# Patient Record
Sex: MALE | Race: NATIVE HAWAIIAN OR OTHER PACIFIC ISLANDER | NOT HISPANIC OR LATINO | URBAN - METROPOLITAN AREA
[De-identification: names, ages, dates, MRNs, and addresses within clinical notes are randomized per-mention and may not be internally consistent; named-entity substitution may affect disease eponyms.]

---

## 2018-12-31 ENCOUNTER — INPATIENT (INPATIENT)
Facility: HOSPITAL | Age: 58
LOS: 2 days | Discharge: HOME | DRG: 287 | End: 2019-01-03
Attending: INTERNAL MEDICINE | Admitting: INTERNAL MEDICINE
Payer: SELF-PAY

## 2018-12-31 VITALS
TEMPERATURE: 98 F | OXYGEN SATURATION: 99 % | WEIGHT: 198.42 LBS | RESPIRATION RATE: 18 BRPM | SYSTOLIC BLOOD PRESSURE: 159 MMHG | HEART RATE: 62 BPM | DIASTOLIC BLOOD PRESSURE: 96 MMHG

## 2018-12-31 DIAGNOSIS — Z09 ENCOUNTER FOR FOLLOW-UP EXAMINATION AFTER COMPLETED TREATMENT FOR CONDITIONS OTHER THAN MALIGNANT NEOPLASM: Chronic | ICD-10-CM

## 2018-12-31 DIAGNOSIS — Z90.49 ACQUIRED ABSENCE OF OTHER SPECIFIED PARTS OF DIGESTIVE TRACT: Chronic | ICD-10-CM

## 2018-12-31 DIAGNOSIS — Z98.890 OTHER SPECIFIED POSTPROCEDURAL STATES: Chronic | ICD-10-CM

## 2018-12-31 DIAGNOSIS — I25.110 ATHEROSCLEROTIC HEART DISEASE OF NATIVE CORONARY ARTERY WITH UNSTABLE ANGINA PECTORIS: ICD-10-CM

## 2018-12-31 LAB
ALBUMIN SERPL ELPH-MCNC: 4.8 G/DL — SIGNIFICANT CHANGE UP (ref 3.3–5)
ALP SERPL-CCNC: 76 U/L — SIGNIFICANT CHANGE UP (ref 40–120)
ALT FLD-CCNC: 46 U/L — HIGH (ref 10–45)
ANION GAP SERPL CALC-SCNC: 11 MMOL/L — SIGNIFICANT CHANGE UP (ref 5–17)
APTT BLD: 34.4 SEC — SIGNIFICANT CHANGE UP (ref 27.5–36.3)
AST SERPL-CCNC: 29 U/L — SIGNIFICANT CHANGE UP (ref 10–40)
BASOPHILS NFR BLD AUTO: 0.5 % — SIGNIFICANT CHANGE UP (ref 0–2)
BILIRUB SERPL-MCNC: 0.7 MG/DL — SIGNIFICANT CHANGE UP (ref 0.2–1.2)
BUN SERPL-MCNC: 14 MG/DL — SIGNIFICANT CHANGE UP (ref 7–23)
CALCIUM SERPL-MCNC: 9.5 MG/DL — SIGNIFICANT CHANGE UP (ref 8.4–10.5)
CHLORIDE SERPL-SCNC: 104 MMOL/L — SIGNIFICANT CHANGE UP (ref 96–108)
CK MB CFR SERPL CALC: 7.4 NG/ML — HIGH (ref 0–6.7)
CK SERPL-CCNC: 363 U/L — HIGH (ref 30–200)
CO2 SERPL-SCNC: 24 MMOL/L — SIGNIFICANT CHANGE UP (ref 22–31)
CREAT SERPL-MCNC: 1.09 MG/DL — SIGNIFICANT CHANGE UP (ref 0.5–1.3)
EOSINOPHIL NFR BLD AUTO: 2.2 % — SIGNIFICANT CHANGE UP (ref 0–6)
EXTRA SST TUBE: SIGNIFICANT CHANGE UP
GLUCOSE SERPL-MCNC: 101 MG/DL — HIGH (ref 70–99)
HCT VFR BLD CALC: 40.5 % — SIGNIFICANT CHANGE UP (ref 39–50)
HGB BLD-MCNC: 14.2 G/DL — SIGNIFICANT CHANGE UP (ref 13–17)
INR BLD: 1.21 — HIGH (ref 0.88–1.16)
LYMPHOCYTES # BLD AUTO: 30.2 % — SIGNIFICANT CHANGE UP (ref 13–44)
MCHC RBC-ENTMCNC: 33.1 PG — SIGNIFICANT CHANGE UP (ref 27–34)
MCHC RBC-ENTMCNC: 35.1 G/DL — SIGNIFICANT CHANGE UP (ref 32–36)
MCV RBC AUTO: 94.4 FL — SIGNIFICANT CHANGE UP (ref 80–100)
MONOCYTES NFR BLD AUTO: 8.1 % — SIGNIFICANT CHANGE UP (ref 2–14)
NEUTROPHILS NFR BLD AUTO: 59 % — SIGNIFICANT CHANGE UP (ref 43–77)
PLATELET # BLD AUTO: 197 K/UL — SIGNIFICANT CHANGE UP (ref 150–400)
POTASSIUM SERPL-MCNC: 3.8 MMOL/L — SIGNIFICANT CHANGE UP (ref 3.5–5.3)
POTASSIUM SERPL-SCNC: 3.8 MMOL/L — SIGNIFICANT CHANGE UP (ref 3.5–5.3)
PROT SERPL-MCNC: 7.7 G/DL — SIGNIFICANT CHANGE UP (ref 6–8.3)
PROTHROM AB SERPL-ACNC: 13.7 SEC — HIGH (ref 10–12.9)
RBC # BLD: 4.29 M/UL — SIGNIFICANT CHANGE UP (ref 4.2–5.8)
RBC # FLD: 11.8 % — SIGNIFICANT CHANGE UP (ref 10.3–16.9)
SODIUM SERPL-SCNC: 139 MMOL/L — SIGNIFICANT CHANGE UP (ref 135–145)
TROPONIN T SERPL-MCNC: <0.01 NG/ML — SIGNIFICANT CHANGE UP (ref 0–0.01)
WBC # BLD: 8 K/UL — SIGNIFICANT CHANGE UP (ref 3.8–10.5)
WBC # FLD AUTO: 8 K/UL — SIGNIFICANT CHANGE UP (ref 3.8–10.5)

## 2018-12-31 PROCEDURE — 93010 ELECTROCARDIOGRAM REPORT: CPT

## 2018-12-31 PROCEDURE — 99285 EMERGENCY DEPT VISIT HI MDM: CPT | Mod: 25

## 2018-12-31 PROCEDURE — 71045 X-RAY EXAM CHEST 1 VIEW: CPT | Mod: 26

## 2018-12-31 NOTE — H&P ADULT - NSHPLABSRESULTS_GEN_ALL_CORE
14.2   8.0   )-----------( 197      ( 31 Dec 2018 17:22 )             40.5       12-31    139  |  104  |  14  ----------------------------<  101<H>  3.8   |  24  |  1.09    Ca    9.5      31 Dec 2018 17:22    TPro  7.7  /  Alb  4.8  /  TBili  0.7  /  DBili  x   /  AST  29  /  ALT  46<H>  /  AlkPhos  76  12-31      PT/INR - ( 31 Dec 2018 17:22 )   PT: 13.7 sec;   INR: 1.21          PTT - ( 31 Dec 2018 17:22 )  PTT:34.4 sec    CARDIAC MARKERS ( 31 Dec 2018 17:22 )  x     / <0.01 ng/mL / 363 U/L / x     / 7.4 ng/mL

## 2018-12-31 NOTE — ED ADULT NURSE NOTE - OBJECTIVE STATEMENT
57 y/o male c/o left sided chest pain x 2 hours associated w/ nausea while [place] to the train. Pt received 162 mg of Aspirin and 2 SL w/ relief of sx. Denies fever, chills, SOB, abdominal pain, back pain, and any other associated sx.

## 2018-12-31 NOTE — H&P ADULT - ASSESSMENT
59 y/o male with HTN, HPL, known CAD with prior MI treated with stents, admitted to [facility] with syncope and chest pain, 1st troponin negative, EKG with RBBB

## 2018-12-31 NOTE — ED PROVIDER NOTE - OBJECTIVE STATEMENT
chest pain started today.  feels better now.  got asa and ntg from ems  hx of stent x 3, last one 4 months ago chest pain started today.  also [place]  feels better now.  got asa and ntg from ems  hx of stent x 3, last one 4 months ago

## 2018-12-31 NOTE — H&P ADULT - NSHPREVIEWOFSYSTEMS_GEN_ALL_CORE
GENERAL, CONSTITUTIONAL : denies recent weight loss, fever, chills  EYES, VISION: denies changes in vision   EARS, NOSE, THROAT: denies hearing loss  HEART, CARDIOVASCULAR: +chest pain, denies arrhythmia, palpitations, SOB,  LE edema, claudication  RESPIRATORY: Denies cough, SOB, wheezing, PND, orthopnea  GASTROINTESTINAL: Denies abdominal pain, heartburn, bloody stool, dark tarry stool  GENITOURINARY: Denies frequent urination, urgency  MUSCULOSKELETAL denies joint pain or swelling, restricted motion, musculoskeletal pain.   SKIN & INTEGUMENTARY Denies rashes, sores, blisters, blisters, growths.  NEUROLOGICAL: Denies numbness or tingling sensations, sensation loss, burning.   PSYCHIATRIC: Denies nervousness, anxiety, depression  ENDOCRINE Denies heat or cold intolerance, excessive thirst  HEMATOLOGIC/LYMPHATIC: Denies abnormal bleeding, bleeding of any kind

## 2018-12-31 NOTE — H&P ADULT - HISTORY OF PRESENT ILLNESS
This is a 57 y/o male, poor historian, PMH HTN, HPL, recently diagnosed with DM-II (no meds), Hepatitis B, known CAD with MI treated  with 3 stents, most recent stent 4 months ago in [place] on Effient, BIBA s/p Syncope. 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 Pt could not elaborate much but states that he has been experiencing mid sternal chest pain rated 6/10, not as strong as his MI. He received Aspirin and Nitro by EMS  In the ER, pain was 3/10, 1st troponin negative, EKG with RBBB, no ST-T change. He is admitted for further evaluation

## 2018-12-31 NOTE — ED ADULT TRIAGE NOTE - CHIEF COMPLAINT QUOTE
patient BIBA complains of left sided chest pain for the last few hours. received 162 aspirin and 2 SL nitro with relief.

## 2018-12-31 NOTE — ED ADULT NURSE NOTE - NSIMPLEMENTINTERV_GEN_ALL_ED
Implemented All Universal Safety Interventions:  [employer] to call system. Call bell, personal items and telephone within reach. Instruct patient to call for assistance. Room bathroom lighting operational. Non-slip footwear when patient is off stretcher. Physically safe environment: no spills, clutter or unnecessary equipment. Stretcher in lowest position, wheels locked, appropriate side rails in place.

## 2018-12-31 NOTE — ED PROVIDER NOTE - MEDICAL DECISION MAKING DETAILS
59 yo male with hx of cad with episode of cp today.  given asa by ems. ekg no st elevation, pain is improved, now mild.  will get cardiac labs, cxr

## 2018-12-31 NOTE — H&P ADULT - PROBLEM SELECTOR PLAN 2
Pt to obtain name of hospital in [place] where he had stents to get report  Continue Effient and Aspirin, Lipitor

## 2018-12-31 NOTE — H&P ADULT - NSHPPHYSICALEXAM_GEN_ALL_CORE
Temp 98.6F, HR 70bpm, /80, RR 16, O2 sat 98% RA, Weight 200lbs, Height 5' 6"    T(C): 36.4 (12-31-18 @ 22:50), Max: 36.8 (12-31-18 @ 16:47)  HR: 53 (12-31-18 @ 23:44) (53 - 70)  BP: 112/62 (12-31-18 @ 23:44) (112/62 - 159/96)  RR: 16 (12-31-18 @ 23:44) (16 - 18)  SpO2: 96% (12-31-18 @ 23:44) (95% - 99%)  Wt(kg): --    Appearance: Normal	  HEENT:   Normal oral mucosa, PERRL, EOMI	  Neck: Supple,  - JVD; No Carotid Bruit and 2+ pulses B/L  Cardiovascular: Normal S1 S2, No JVD, No murmurs  Respiratory: Lungs clear to auscultation, No Rales, Rhonchi, Wheezing	  Gastrointestinal:  Soft, Non-tender, + BS	  Skin: No rashes, No ecchymoses, No cyanosis  Extremities: Normal range of motion, No clubbing, cyanosis or edema  Vascular: Femoral pulses 2+ b/l without bruit, DP 1+ b/l, PT 1+ b/l  Neurologic: Non-focal  Psychiatry: A & O x 3, Mood & affect appropriate

## 2018-12-31 NOTE — ED PROVIDER NOTE - CARE PLAN
Principal Discharge DX:	Chest pain with moderate risk for cardiac etiology  Secondary Diagnosis:	Syncope, unspecified syncope type  Secondary Diagnosis:	CAD (coronary artery disease)  Secondary Diagnosis:	Hypertension, unspecified type

## 2019-01-01 DIAGNOSIS — I25.10 ATHEROSCLEROTIC HEART DISEASE OF NATIVE CORONARY ARTERY WITHOUT ANGINA PECTORIS: ICD-10-CM

## 2019-01-01 DIAGNOSIS — R55 SYNCOPE AND COLLAPSE: ICD-10-CM

## 2019-01-01 DIAGNOSIS — E11.9 TYPE 2 DIABETES MELLITUS WITHOUT COMPLICATIONS: ICD-10-CM

## 2019-01-01 LAB
ANION GAP SERPL CALC-SCNC: 17 MMOL/L — SIGNIFICANT CHANGE UP (ref 5–17)
BUN SERPL-MCNC: 16 MG/DL — SIGNIFICANT CHANGE UP (ref 7–23)
CALCIUM SERPL-MCNC: 9.4 MG/DL — SIGNIFICANT CHANGE UP (ref 8.4–10.5)
CHLORIDE SERPL-SCNC: 99 MMOL/L — SIGNIFICANT CHANGE UP (ref 96–108)
CO2 SERPL-SCNC: 21 MMOL/L — LOW (ref 22–31)
CREAT SERPL-MCNC: 0.99 MG/DL — SIGNIFICANT CHANGE UP (ref 0.5–1.3)
GLUCOSE SERPL-MCNC: 134 MG/DL — HIGH (ref 70–99)
HBA1C BLD-MCNC: 5.5 % — SIGNIFICANT CHANGE UP (ref 4–5.6)
HCT VFR BLD CALC: 42.6 % — SIGNIFICANT CHANGE UP (ref 39–50)
HGB BLD-MCNC: 14.9 G/DL — SIGNIFICANT CHANGE UP (ref 13–17)
LACTATE SERPL-SCNC: 1.4 MMOL/L — SIGNIFICANT CHANGE UP (ref 0.5–2)
MAGNESIUM SERPL-MCNC: 2.1 MG/DL — SIGNIFICANT CHANGE UP (ref 1.6–2.6)
MCHC RBC-ENTMCNC: 33 PG — SIGNIFICANT CHANGE UP (ref 27–34)
MCHC RBC-ENTMCNC: 35 G/DL — SIGNIFICANT CHANGE UP (ref 32–36)
MCV RBC AUTO: 94.5 FL — SIGNIFICANT CHANGE UP (ref 80–100)
PLATELET # BLD AUTO: 202 K/UL — SIGNIFICANT CHANGE UP (ref 150–400)
POTASSIUM SERPL-MCNC: 4 MMOL/L — SIGNIFICANT CHANGE UP (ref 3.5–5.3)
POTASSIUM SERPL-SCNC: 4 MMOL/L — SIGNIFICANT CHANGE UP (ref 3.5–5.3)
RBC # BLD: 4.51 M/UL — SIGNIFICANT CHANGE UP (ref 4.2–5.8)
RBC # FLD: 12 % — SIGNIFICANT CHANGE UP (ref 10.3–16.9)
SODIUM SERPL-SCNC: 137 MMOL/L — SIGNIFICANT CHANGE UP (ref 135–145)
TROPONIN T SERPL-MCNC: <0.01 NG/ML — SIGNIFICANT CHANGE UP (ref 0–0.01)
TROPONIN T SERPL-MCNC: <0.01 NG/ML — SIGNIFICANT CHANGE UP (ref 0–0.01)
WBC # BLD: 8.5 K/UL — SIGNIFICANT CHANGE UP (ref 3.8–10.5)
WBC # FLD AUTO: 8.5 K/UL — SIGNIFICANT CHANGE UP (ref 3.8–10.5)

## 2019-01-01 PROCEDURE — 99220: CPT

## 2019-01-01 PROCEDURE — 93010 ELECTROCARDIOGRAM REPORT: CPT

## 2019-01-01 RX ORDER — HEPARIN SODIUM 5000 [USP'U]/ML
5000 INJECTION INTRAVENOUS; SUBCUTANEOUS EVERY 8 HOURS
Qty: 0 | Refills: 0 | Status: DISCONTINUED | OUTPATIENT
Start: 2019-01-01 | End: 2019-01-02

## 2019-01-01 RX ORDER — SODIUM CHLORIDE 9 MG/ML
1000 INJECTION INTRAMUSCULAR; INTRAVENOUS; SUBCUTANEOUS
Qty: 0 | Refills: 0 | Status: DISCONTINUED | OUTPATIENT
Start: 2019-01-02 | End: 2019-01-02

## 2019-01-01 RX ORDER — PRASUGREL 5 MG/1
10 TABLET, FILM COATED ORAL DAILY
Qty: 0 | Refills: 0 | Status: DISCONTINUED | OUTPATIENT
Start: 2019-01-01 | End: 2019-01-03

## 2019-01-01 RX ORDER — ATORVASTATIN CALCIUM 80 MG/1
80 TABLET, FILM COATED ORAL AT BEDTIME
Qty: 0 | Refills: 0 | Status: DISCONTINUED | OUTPATIENT
Start: 2019-01-01 | End: 2019-01-03

## 2019-01-01 RX ORDER — ASPIRIN/CALCIUM CARB/MAGNESIUM 324 MG
81 TABLET ORAL DAILY
Qty: 0 | Refills: 0 | Status: DISCONTINUED | OUTPATIENT
Start: 2019-01-01 | End: 2019-01-03

## 2019-01-01 RX ORDER — ATORVASTATIN CALCIUM 80 MG/1
40 TABLET, FILM COATED ORAL AT BEDTIME
Qty: 0 | Refills: 0 | Status: DISCONTINUED | OUTPATIENT
Start: 2019-01-01 | End: 2019-01-01

## 2019-01-01 RX ORDER — METOPROLOL TARTRATE 50 MG
25 TABLET ORAL
Qty: 0 | Refills: 0 | Status: DISCONTINUED | OUTPATIENT
Start: 2019-01-01 | End: 2019-01-03

## 2019-01-01 RX ADMIN — HEPARIN SODIUM 5000 UNIT(S): 5000 INJECTION INTRAVENOUS; SUBCUTANEOUS at 21:12

## 2019-01-01 RX ADMIN — Medication 81 MILLIGRAM(S): at 11:45

## 2019-01-01 RX ADMIN — HEPARIN SODIUM 5000 UNIT(S): 5000 INJECTION INTRAVENOUS; SUBCUTANEOUS at 14:13

## 2019-01-01 RX ADMIN — ATORVASTATIN CALCIUM 40 MILLIGRAM(S): 80 TABLET, FILM COATED ORAL at 00:49

## 2019-01-01 RX ADMIN — Medication 25 MILLIGRAM(S): at 05:46

## 2019-01-01 NOTE — PROGRESS NOTE ADULT - SUBJECTIVE AND OBJECTIVE BOX
Interventional Cardiology PA Adult Progress Note    CC: syncope and CP on admission   Subjective Assessment: Pt was examined at bedside this AM. Pt feeling well this AM. Denies CP or SOB  12 point ROS negative except as per subjective   	  MEDICATIONS:  metoprolol succinate ER 25 milliGRAM(s) Oral two times a day  atorvastatin 80 milliGRAM(s) Oral at bedtime  aspirin enteric coated 81 milliGRAM(s) Oral daily  prasugrel 10 milliGRAM(s) Oral daily      	    [PHYSICAL EXAM:  TELEMETRY:  T(C): 36.7 (01-01-19 @ 08:36), Max: 36.8 (12-31-18 @ 16:47)  HR: 50 (01-01-19 @ 08:10) (50 - 70)  BP: 128/81 (01-01-19 @ 08:10) (112/62 - 159/96)  RR: 18 (01-01-19 @ 08:10) (16 - 18)  SpO2: 94% (01-01-19 @ 08:10) (94% - 99%)    I&O's Summary    31 Dec 2018 07:01  -  01 Jan 2019 07:00  --------------------------------------------------------  IN: 90 mL / OUT: 0 mL / NET: 90 mL      Height (cm): 178 (12-31 @ 22:10)  Weight (kg): 89.9 (12-31 @ 22:10)  BMI (kg/m2): 28.4 (12-31 @ 22:10)  BSA (m2): 2.08 (12-31 @ 22:10)    Mora: no  Central/PICC/Mid Line: no                                     Appearance: Normal	  HEENT:   Normal oral mucosa, PERRL, EOMI	  Neck: Supple,  - JVD;   Cardiovascular: Normal S1 S2, No JVD, No murmurs,   Respiratory: Lungs clear to auscultation, No Rales, Rhonchi, Wheezing	  Gastrointestinal:  Soft, Non-tender, + BS	  Skin: No rashes, No ecchymoses, No cyanosis  Extremities: Normal range of motion, No clubbing, cyanosis or edema  Vascular: Peripheral pulses palpable 2+ bilaterally  Neurologic: Non-focal  Psychiatry: A & O x 3, Mood & affect appropriate      	    	    LABS:	 	    CARDIAC MARKERS ( 01 Jan 2019 05:45 )  x     / <0.01 ng/mL / x     / x     / x      CARDIAC MARKERS ( 01 Jan 2019 00:10 )  x     / <0.01 ng/mL / x     / x     / x      CARDIAC MARKERS ( 31 Dec 2018 17:22 )  x     / <0.01 ng/mL / 363 U/L / x     / 7.4 ng/mL                        14.9   8.5   )-----------( 202      ( 01 Jan 2019 05:47 )             42.6     01-01    137  |  99  |  16  ----------------------------<  134<H>  4.0   |  21<L>  |  0.99    Ca    9.4      01 Jan 2019 05:45  Mg     2.1     01-01    TPro  7.7  /  Alb  4.8  /  TBili  0.7  /  DBili  x   /  AST  29  /  ALT  46<H>  /  AlkPhos  76  12-31      HgA1c: Hemoglobin A1C, Whole Blood: 5.5 % (01-01 @ 05:48)      PT/INR - ( 31 Dec 2018 17:22 )   PT: 13.7 sec;   INR: 1.21          PTT - ( 31 Dec 2018 17:22 )  PTT:34.4 sec

## 2019-01-01 NOTE — PROGRESS NOTE ADULT - ASSESSMENT
57 y/o male with HTN, HPL, known CAD with prior MI treated with stents, admitted to [facility] with syncope and chest pain, 1st troponin negative, EKG with RBBB. NPO for cardiac cath in AM

## 2019-01-02 DIAGNOSIS — I20.0 UNSTABLE ANGINA: ICD-10-CM

## 2019-01-02 LAB
ANION GAP SERPL CALC-SCNC: 14 MMOL/L — SIGNIFICANT CHANGE UP (ref 5–17)
BUN SERPL-MCNC: 19 MG/DL — SIGNIFICANT CHANGE UP (ref 7–23)
CALCIUM SERPL-MCNC: 9.5 MG/DL — SIGNIFICANT CHANGE UP (ref 8.4–10.5)
CHLORIDE SERPL-SCNC: 104 MMOL/L — SIGNIFICANT CHANGE UP (ref 96–108)
CO2 SERPL-SCNC: 22 MMOL/L — SIGNIFICANT CHANGE UP (ref 22–31)
CREAT SERPL-MCNC: 0.96 MG/DL — SIGNIFICANT CHANGE UP (ref 0.5–1.3)
GLUCOSE SERPL-MCNC: 165 MG/DL — HIGH (ref 70–99)
HCT VFR BLD CALC: 41.3 % — SIGNIFICANT CHANGE UP (ref 39–50)
HGB BLD-MCNC: 14.5 G/DL — SIGNIFICANT CHANGE UP (ref 13–17)
MAGNESIUM SERPL-MCNC: 2 MG/DL — SIGNIFICANT CHANGE UP (ref 1.6–2.6)
MCHC RBC-ENTMCNC: 33 PG — SIGNIFICANT CHANGE UP (ref 27–34)
MCHC RBC-ENTMCNC: 35.1 G/DL — SIGNIFICANT CHANGE UP (ref 32–36)
MCV RBC AUTO: 93.9 FL — SIGNIFICANT CHANGE UP (ref 80–100)
PLATELET # BLD AUTO: 194 K/UL — SIGNIFICANT CHANGE UP (ref 150–400)
POTASSIUM SERPL-MCNC: 3.8 MMOL/L — SIGNIFICANT CHANGE UP (ref 3.5–5.3)
POTASSIUM SERPL-SCNC: 3.8 MMOL/L — SIGNIFICANT CHANGE UP (ref 3.5–5.3)
RBC # BLD: 4.4 M/UL — SIGNIFICANT CHANGE UP (ref 4.2–5.8)
RBC # FLD: 12.1 % — SIGNIFICANT CHANGE UP (ref 10.3–16.9)
SODIUM SERPL-SCNC: 140 MMOL/L — SIGNIFICANT CHANGE UP (ref 135–145)
WBC # BLD: 7.7 K/UL — SIGNIFICANT CHANGE UP (ref 3.8–10.5)
WBC # FLD AUTO: 7.7 K/UL — SIGNIFICANT CHANGE UP (ref 3.8–10.5)

## 2019-01-02 PROCEDURE — 93306 TTE W/DOPPLER COMPLETE: CPT | Mod: 26

## 2019-01-02 PROCEDURE — 93458 L HRT ARTERY/VENTRICLE ANGIO: CPT | Mod: 26

## 2019-01-02 RX ORDER — POTASSIUM CHLORIDE 20 MEQ
20 PACKET (EA) ORAL ONCE
Qty: 0 | Refills: 0 | Status: COMPLETED | OUTPATIENT
Start: 2019-01-02 | End: 2019-01-02

## 2019-01-02 RX ORDER — SODIUM CHLORIDE 9 MG/ML
500 INJECTION INTRAMUSCULAR; INTRAVENOUS; SUBCUTANEOUS
Qty: 0 | Refills: 0 | Status: DISCONTINUED | OUTPATIENT
Start: 2019-01-02 | End: 2019-01-03

## 2019-01-02 RX ADMIN — Medication 25 MILLIGRAM(S): at 07:09

## 2019-01-02 RX ADMIN — ATORVASTATIN CALCIUM 80 MILLIGRAM(S): 80 TABLET, FILM COATED ORAL at 21:45

## 2019-01-02 RX ADMIN — SODIUM CHLORIDE 75 MILLILITER(S): 9 INJECTION INTRAMUSCULAR; INTRAVENOUS; SUBCUTANEOUS at 00:05

## 2019-01-02 RX ADMIN — Medication 20 MILLIEQUIVALENT(S): at 07:54

## 2019-01-02 RX ADMIN — SODIUM CHLORIDE 75 MILLILITER(S): 9 INJECTION INTRAMUSCULAR; INTRAVENOUS; SUBCUTANEOUS at 20:00

## 2019-01-02 RX ADMIN — Medication 81 MILLIGRAM(S): at 06:43

## 2019-01-02 RX ADMIN — PRASUGREL 10 MILLIGRAM(S): 5 TABLET, FILM COATED ORAL at 06:43

## 2019-01-02 NOTE — PROGRESS NOTE ADULT - ASSESSMENT
57 y/o male with HTN, HPL, known CAD with prior MI treated with stents, admitted to [facility] with syncope and chest pain, 1st troponin negative, EKG with RBBB. NPO for cardiac cath in AM

## 2019-01-02 NOTE — PROGRESS NOTE ADULT - SUBJECTIVE AND OBJECTIVE BOX
Interventional Cardiology PA Adult Progress Note    CC: syncope and CP on admission  Subjective Assessment: Pt was examined at bedside. Feeling well. Denies CP or SOB  12 point ROS negative except as per subjective  	  MEDICATIONS:  metoprolol succinate ER 25 milliGRAM(s) Oral two times a day  atorvastatin 80 milliGRAM(s) Oral at bedtime  aspirin enteric coated 81 milliGRAM(s) Oral daily  heparin  Injectable 5000 Unit(s) SubCutaneous every 8 hours  prasugrel 10 milliGRAM(s) Oral daily  sodium chloride 0.9%. 1000 milliLiter(s) IV Continuous <Continuous>      	    [PHYSICAL EXAM:  TELEMETRY:  T(C): 36.3 (01-02-19 @ 13:47), Max: 36.4 (01-01-19 @ 18:22)  HR: 58 (01-02-19 @ 14:34) (50 - 61)  BP: 133/71 (01-02-19 @ 14:34) (105/61 - 137/80)  RR: 18 (01-02-19 @ 14:34) (18 - 18)  SpO2: 98% (01-02-19 @ 14:34) (94% - 98%)  Wt(kg): --  I&O's Summary    01 Jan 2019 07:01  -  02 Jan 2019 07:00  --------------------------------------------------------  IN: 540 mL / OUT: 350 mL / NET: 190 mL    02 Jan 2019 07:01  -  02 Jan 2019 17:59  --------------------------------------------------------  IN: 30 mL / OUT: 0 mL / NET: 30 mL        Mora: No  Central/PICC/Mid Line: No                                   Appearance: Normal	  HEENT:   Normal oral mucosa, PERRL, EOMI	  Neck: Supple, - JVD;   Cardiovascular: Normal S1 S2, No JVD, No murmurs,   Respiratory: Lungs clear to auscultation, No Rales, Rhonchi, Wheezing	  Gastrointestinal:  Soft, Non-tender, + BS	  Skin: No rashes, No ecchymoses, No cyanosis  Extremities: Normal range of motion, No clubbing, cyanosis or edema  Vascular: Peripheral pulses palpable 2+ bilaterally  Neurologic: Non-focal  Psychiatry: A & O x 3, Mood & affect appropriate      	    LABS:	 	                          14.5   7.7   )-----------( 194      ( 02 Jan 2019 06:41 )             41.3     01-02    140  |  104  |  19  ----------------------------<  165<H>  3.8   |  22  |  0.96    Ca    9.5      02 Jan 2019 06:41  Mg     2.0     01-02

## 2019-01-02 NOTE — PROGRESS NOTE ADULT - PROBLEM SELECTOR PLAN 2
-Complaining of chest pressure with syncope on admission  -Pt with hx of MI with stent of LAD in 09/2018  -continue aspirin, Effient, BB, home Lipitor increased to 80mg QHS per Dr. Fernandez  -NPO for cath    DVT PPx: SQH  Dispo: NPO for cath    Case d/w Dr. Fernandez

## 2019-01-03 VITALS
HEART RATE: 82 BPM | OXYGEN SATURATION: 97 % | DIASTOLIC BLOOD PRESSURE: 74 MMHG | RESPIRATION RATE: 16 BRPM | SYSTOLIC BLOOD PRESSURE: 121 MMHG

## 2019-01-03 LAB
ANION GAP SERPL CALC-SCNC: 16 MMOL/L — SIGNIFICANT CHANGE UP (ref 5–17)
BUN SERPL-MCNC: 16 MG/DL — SIGNIFICANT CHANGE UP (ref 7–23)
CALCIUM SERPL-MCNC: 9.3 MG/DL — SIGNIFICANT CHANGE UP (ref 8.4–10.5)
CHLORIDE SERPL-SCNC: 101 MMOL/L — SIGNIFICANT CHANGE UP (ref 96–108)
CO2 SERPL-SCNC: 21 MMOL/L — LOW (ref 22–31)
CREAT SERPL-MCNC: 0.93 MG/DL — SIGNIFICANT CHANGE UP (ref 0.5–1.3)
GLUCOSE SERPL-MCNC: 165 MG/DL — HIGH (ref 70–99)
HCT VFR BLD CALC: 41.2 % — SIGNIFICANT CHANGE UP (ref 39–50)
HGB BLD-MCNC: 14.5 G/DL — SIGNIFICANT CHANGE UP (ref 13–17)
MAGNESIUM SERPL-MCNC: 2 MG/DL — SIGNIFICANT CHANGE UP (ref 1.6–2.6)
MCHC RBC-ENTMCNC: 33.3 PG — SIGNIFICANT CHANGE UP (ref 27–34)
MCHC RBC-ENTMCNC: 35.2 G/DL — SIGNIFICANT CHANGE UP (ref 32–36)
MCV RBC AUTO: 94.7 FL — SIGNIFICANT CHANGE UP (ref 80–100)
PLATELET # BLD AUTO: 185 K/UL — SIGNIFICANT CHANGE UP (ref 150–400)
POTASSIUM SERPL-MCNC: 4 MMOL/L — SIGNIFICANT CHANGE UP (ref 3.5–5.3)
POTASSIUM SERPL-SCNC: 4 MMOL/L — SIGNIFICANT CHANGE UP (ref 3.5–5.3)
RBC # BLD: 4.35 M/UL — SIGNIFICANT CHANGE UP (ref 4.2–5.8)
RBC # FLD: 11.9 % — SIGNIFICANT CHANGE UP (ref 10.3–16.9)
SODIUM SERPL-SCNC: 138 MMOL/L — SIGNIFICANT CHANGE UP (ref 135–145)
WBC # BLD: 8.8 K/UL — SIGNIFICANT CHANGE UP (ref 3.8–10.5)
WBC # FLD AUTO: 8.8 K/UL — SIGNIFICANT CHANGE UP (ref 3.8–10.5)

## 2019-01-03 PROCEDURE — 93880 EXTRACRANIAL BILAT STUDY: CPT | Mod: 26

## 2019-01-03 RX ORDER — ASPIRIN/CALCIUM CARB/MAGNESIUM 324 MG
1 TABLET ORAL
Qty: 30 | Refills: 11 | OUTPATIENT
Start: 2019-01-03 | End: 2019-12-28

## 2019-01-03 RX ORDER — PRASUGREL 5 MG/1
1 TABLET, FILM COATED ORAL
Qty: 0 | Refills: 0 | COMMUNITY

## 2019-01-03 RX ORDER — PRASUGREL 5 MG/1
1 TABLET, FILM COATED ORAL
Qty: 30 | Refills: 11 | OUTPATIENT
Start: 2019-01-03 | End: 2019-12-28

## 2019-01-03 RX ORDER — ASPIRIN/CALCIUM CARB/MAGNESIUM 324 MG
1 TABLET ORAL
Qty: 0 | Refills: 0 | COMMUNITY

## 2019-01-03 RX ORDER — ATORVASTATIN CALCIUM 80 MG/1
1 TABLET, FILM COATED ORAL
Qty: 0 | Refills: 0 | COMMUNITY

## 2019-01-03 RX ORDER — ATORVASTATIN CALCIUM 80 MG/1
1 TABLET, FILM COATED ORAL
Qty: 30 | Refills: 3 | OUTPATIENT
Start: 2019-01-03 | End: 2019-05-02

## 2019-01-03 RX ORDER — METOPROLOL TARTRATE 50 MG
1 TABLET ORAL
Qty: 60 | Refills: 3 | OUTPATIENT
Start: 2019-01-03 | End: 2019-05-02

## 2019-01-03 RX ADMIN — Medication 25 MILLIGRAM(S): at 05:53

## 2019-01-03 RX ADMIN — PRASUGREL 10 MILLIGRAM(S): 5 TABLET, FILM COATED ORAL at 12:12

## 2019-01-03 RX ADMIN — Medication 81 MILLIGRAM(S): at 12:12

## 2019-01-03 RX ADMIN — Medication 25 MILLIGRAM(S): at 17:43

## 2019-01-03 NOTE — DISCHARGE NOTE ADULT - PATIENT PORTAL LINK FT
You can access the [facility] Patient Portal, offered by [facility], by registering with the following website: [email or web address]

## 2019-01-03 NOTE — DISCHARGE NOTE ADULT - INSTRUCTIONS
Please continue a heart healthy diet low in sodium, cholesterol, and fat.   You underwent a coronary angiogram and should wait 3 days before returning to ordinary activities. Do not drive for 2 days. Consult your doctor before returning to vigorous activity. You may return to work in 3-5 days. The catheter from your groin was removed and you should remove the dressing in 24 hours. You may shower once the dressing is removed, but avoid baths, hot tubs, or swimming for 5 days to prevent infection. If you notice bleeding from the site, hardening and pain at the site, drainage or redness from the site, coolness/paleness of the extremity, swelling, or fever, please call [phone]. Please continue your aspirin and Effient as prescribed unless otherwise indicated by your cardiologist. All of your prescriptions have been sent to the pharmacy. Please follow up with Dr. Fernandez in 1-2 weeks. All of your needed prescriptions have been sent

## 2019-01-03 NOTE — DISCHARGE NOTE ADULT - PLAN OF CARE
You underwent cardiac catheterization which was revealed no obstruction and patent stents. You also had an echocardiogram and carotid ultrasound which also were normal. -Please continue aspirin 81mg orally daily, Effient (prasugrel) 10mg orally daily, metoprolol succinate 25mg orally daily and your atorvastatin was increased to 80mg orally at bedtime  -Please follow up with Dr. Fernandez 1-2weeks

## 2019-01-03 NOTE — DISCHARGE NOTE ADULT - ADDITIONAL INSTRUCTIONS
All of your medications have been prescribed to you please take as directed, do not stop unless directed by physician.  Please follow up with Dr. Fernandez in 1-2 weeks. Please return to the hospital/seek immediate medical attention if worsening of symptoms- including not limited to chest pain, shortness of breath

## 2019-01-03 NOTE — DISCHARGE NOTE ADULT - HOSPITAL COURSE
This is a 57 y/o male, poor historian, PMH HTN, HPL, recently diagnosed with DM-II (no meds), Hepatitis B, known CAD with MI treated  with 3 stents, most recent stent 4 months ago in [place] on Effient, BIBA s/p Syncope. 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 Pt could not elaborate much but states that he has been experiencing mid sternal chest pain rated 6/10, not as strong as his MI. He received Aspirin and Nitro by EMS. In the ER, pain was 3/10, 1st troponin negative, EKG with RBBB, no ST-T change. He is admitted for further evaluation    ECHO 01/02/19: This is a 59 y/o male, poor historian, PMH HTN, HPL, recently diagnosed with DM-II (no meds), Hepatitis B, known CAD with MI treated  with 3 stents, most recent stent 4 months ago in [place] on Effient, BIBA s/p Syncope. 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 Pt could not elaborate much but states that he has been experiencing mid sternal chest pain rated 6/10, not as strong as his MI. He received Aspirin and Nitro by EMS. In the ER, pain was 3/10, 1st troponin negative, EKG with RBBB, no ST-T change. He is admitted for further evaluation    ECHO 01/02/19: The left atrial size is normal. Right atrial size is normal. Structurally normal aortic valve. The aortic valve is trileaflet. No aortic regurgitation   noted. No hemodynamically significant valvular aortic stenosis. Structurally  normal mitral valve. There is trace mitral regurgitation. Structurally normal tricuspid valve. There is trace tricuspid regurgitation. There was insufficient TR detected from which to calculate pulmonary artery systolic pressure. The pulmonic valve is not well visualized. There is trace pulmonic regurgitation. The right ventricle is normal in size and function. There is mild concentric left ventricular hypertrophy. Suboptimal endocardial delineation - Probably  normal left ventricular wall motion. The left ventricular ejection fraction is 54%. Normal LV diastolic function.  No aortic root dilatation. There is no pericardial effusion. No prior study for comparison.     US carotid 01/09/2019: no significant stenosis    Pt is s/p cath 1/02 diagnostic. R groin perclosed without complications. Overnight, no events. This AM, feels well, denies CP, SOB, n/v/d, LE edema. VSS, no events on tele, labs unremarkable. PE sig for R groin site stable c/d/i,  no bleeding, pain or hematoma. Patient was seen and examined by attending who agrees with above d/c plan. All meds rx to pharmacy and explained to patient. Patient instructed to return if sx worsen including not limited to chest pain, shortness of breath.

## 2019-01-03 NOTE — DISCHARGE NOTE ADULT - CARE PROVIDER_API CALL
Gregg Fernandez), Cardiology; Internal Medicine  [address] [place] [address]  Phone: [phone]  Fax: [phone]

## 2019-01-03 NOTE — DISCHARGE NOTE ADULT - MEDICATION SUMMARY - MEDICATIONS TO STOP TAKING
I will STOP taking the medications listed below when I get home from the hospital:    Lipitor 40 mg oral tablet  -- 1 tab(s) by mouth once a day

## 2019-01-08 DIAGNOSIS — Z90.49 ACQUIRED ABSENCE OF OTHER SPECIFIED PARTS OF DIGESTIVE TRACT: ICD-10-CM

## 2019-01-08 DIAGNOSIS — R07.9 CHEST PAIN, UNSPECIFIED: ICD-10-CM

## 2019-01-08 DIAGNOSIS — Z95.5 PRESENCE OF CORONARY ANGIOPLASTY IMPLANT AND GRAFT: ICD-10-CM

## 2019-01-08 DIAGNOSIS — Z98.890 OTHER SPECIFIED POSTPROCEDURAL STATES: ICD-10-CM

## 2019-01-08 DIAGNOSIS — I25.2 OLD MYOCARDIAL INFARCTION: ICD-10-CM

## 2019-01-08 DIAGNOSIS — I10 ESSENTIAL (PRIMARY) HYPERTENSION: ICD-10-CM

## 2019-01-08 DIAGNOSIS — R55 SYNCOPE AND COLLAPSE: ICD-10-CM

## 2019-01-08 DIAGNOSIS — I20.0 UNSTABLE ANGINA: ICD-10-CM

## 2019-01-08 DIAGNOSIS — I25.10 ATHEROSCLEROTIC HEART DISEASE OF NATIVE CORONARY ARTERY WITHOUT ANGINA PECTORIS: ICD-10-CM

## 2019-01-08 DIAGNOSIS — I45.10 UNSPECIFIED RIGHT BUNDLE-BRANCH BLOCK: ICD-10-CM

## 2019-01-08 DIAGNOSIS — E11.9 TYPE 2 DIABETES MELLITUS WITHOUT COMPLICATIONS: ICD-10-CM

## 2019-01-17 PROCEDURE — 93005 ELECTROCARDIOGRAM TRACING: CPT

## 2019-01-17 PROCEDURE — 84484 ASSAY OF TROPONIN QUANT: CPT

## 2019-01-17 PROCEDURE — 93880 EXTRACRANIAL BILAT STUDY: CPT

## 2019-01-17 PROCEDURE — 80053 COMPREHEN METABOLIC PANEL: CPT

## 2019-01-17 PROCEDURE — 83735 ASSAY OF MAGNESIUM: CPT

## 2019-01-17 PROCEDURE — 85610 PROTHROMBIN TIME: CPT

## 2019-01-17 PROCEDURE — 82550 ASSAY OF CK (CPK): CPT

## 2019-01-17 PROCEDURE — 99285 EMERGENCY DEPT VISIT HI MDM: CPT | Mod: 25

## 2019-01-17 PROCEDURE — C1769: CPT

## 2019-01-17 PROCEDURE — 80048 BASIC METABOLIC PNL TOTAL CA: CPT

## 2019-01-17 PROCEDURE — 71045 X-RAY EXAM CHEST 1 VIEW: CPT

## 2019-01-17 PROCEDURE — 93306 TTE W/DOPPLER COMPLETE: CPT

## 2019-01-17 PROCEDURE — 85730 THROMBOPLASTIN TIME PARTIAL: CPT

## 2019-01-17 PROCEDURE — 85025 COMPLETE CBC W/AUTO DIFF WBC: CPT

## 2019-01-17 PROCEDURE — C1894: CPT

## 2019-01-17 PROCEDURE — C1887: CPT

## 2019-01-17 PROCEDURE — 83036 HEMOGLOBIN GLYCOSYLATED A1C: CPT

## 2019-01-17 PROCEDURE — 83605 ASSAY OF LACTIC ACID: CPT

## 2019-01-17 PROCEDURE — 82553 CREATINE MB FRACTION: CPT

## 2019-01-17 PROCEDURE — C1760: CPT

## 2019-01-17 PROCEDURE — 36415 COLL VENOUS BLD VENIPUNCTURE: CPT

## 2019-01-17 PROCEDURE — 85027 COMPLETE CBC AUTOMATED: CPT

## 2020-07-17 NOTE — PATIENT PROFILE ADULT - NSPROGENARRIVEDFROM_GEN_A_NUR
Detail Level: Simple
Detail Level: Detailed
[place]o chest [place]. Brought to hospital by ambulance

## 2021-12-11 NOTE — PATIENT PROFILE ADULT - BRADEN ACTIVITY
Assistance with ambulation/Assistance OOB with selected safe patient handling equipment/Communicate Risk of Fall with Harm to all staff/Discuss with provider need for PT consult/Monitor gait and stability/Reinforce activity limits and safety measures with patient and family/Sit up slowly, dangle for a short time, stand at bedside before walking/Tailored Fall Risk Interventions/Use of alarms - bed, chair and/or voice tab/Visual Cue: Yellow wristband and red socks/Bed in lowest position, wheels locked, appropriate side rails in place/Call bell, personal items and telephone in reach/Instruct patient to call for assistance before getting out of bed or chair/Non-slip footwear when patient is out of bed/[employer] to call system/Physically safe environment - no spills, clutter or unnecessary equipment/Purposeful Proactive Rounding/Room/bathroom lighting operational, light cord in reach (4) walks frequently

## 2022-09-05 NOTE — ED ADULT NURSE NOTE - PMH
Care Due:                  Date            Visit Type   Department     Provider  --------------------------------------------------------------------------------                                SAME DAY -                              ESTABLISHED   [facility] INTERNAL  Last Visit: 10-      PATIENT      MEDICINE       Tabatha Pham  Next Visit: None Scheduled  None         None Found                                                            Last  Test          Frequency    Reason                     Performed    Due Date  --------------------------------------------------------------------------------    Lipid Panel.  12 months..  amlodipine-atorvastatin..  06- 06-    [facility] Embedded Care Gaps. Reference number: [id number]. 9/05/2022   8:53:53 AM CDT   CAD (coronary artery disease)    Hypertension

## 2022-11-20 NOTE — DISCHARGE NOTE ADULT - MEDICATION SUMMARY - MEDICATIONS TO TAKE
Bedside and Verbal shift change report given to Gavin Geller (oncoming nurse) by Blair Pallas, RN (offgoing nurse). Report included the following information SBAR, Kardex, MAR, Cardiac Rhythm NSR-ST, and Dual Neuro Assessment. I will START or STAY ON the medications listed below when I get home from the hospital:    aspirin 81 mg oral delayed release tablet  -- 1 tab(s) by mouth once a day  -- Indication: For CAD (coronary artery disease)    atorvastatin 80 mg oral tablet  -- 1 tab(s) by mouth once a day   -- Avoid grapefruit and grapefruit juice while taking this medication.  Do not take this drug if you are pregnant.  It is very important that you take or use this exactly as directed.  Do not skip doses or discontinue unless directed by your doctor.  Obtain medical advice before taking any non-prescription drugs as some may affect the action of this medication.  Take with food or milk.    -- Indication: For CAD (coronary artery disease)    Effient 10 mg oral tablet  -- 1 tab(s) by mouth once a day  -- Indication: For CAD (coronary artery disease)    metoprolol succinate 25 mg oral tablet, extended release  -- 1 tab(s) by mouth 2 times a day  -- Indication: For CAD (coronary artery disease)

## 2024-03-06 NOTE — ED ADULT NURSE NOTE - NSFALLRSKPASTHIST_ED_ALL_ED
Attempted to reach patient for: 2nd attempt to introduce and enroll patient in the RN Care Management Program.    Outcome: Left voicemail with RNCC's name and contact information and also sent RNCC Unable to Reach Pending Letter to patient.     Next Follow Up: In one week.     DEEDEE Sidhu, RN  Nurse Care Manager  Phone: [phone]  
no
soft/nontender/no distention/no masses palpable/bowel sounds normal/no bruit/no rebound tenderness/no guarding/no rigidity

## 2024-03-20 NOTE — PROGRESS NOTE ADULT - PROBLEM SELECTOR PLAN 2
Liver, kidneys, and electrolytes were normal.    Valentina Sahni MD   -Pt with MI treated with prior stents ([facility]) will attempt to obtain report in AM  Continue Effient, Aspirin and BB, home Lipitor increased to 80mg QHS per Dr. Fernandez  -NPO after MN for cardiac cath in AM needs to be put on schedule consented    DVT PPx: SQH  Dispo: NPO for cath in AM    Case d/w Dr. Fernandez -Pt with hx of MI treated with prior stents ([facility]) will attempt to obtain report in AM  Continue Effient, Aspirin and BB, home Lipitor increased to 80mg QHS per Dr. Fernandez  -NPO after MN for cardiac cath in AM needs to be put on schedule consented    DVT PPx: SQH  Dispo: NPO for cath in AM    Case d/w Dr. Fernandez -Complaining of chest pressure with syncope on admission  -Pt with hx of MI with stent of LAD in 09/2018  -continue aspirin, Effient, BB, home Lipitor increased to 80mg QHS per Dr. Fernadnez  -NPO for cath    DVT PPx: SQH  Dispo: NPO for cath    Case d/w Dr. Fernandez

## 2025-06-16 NOTE — DISCHARGE NOTE ADULT - CARE PLAN
with patient Principal Discharge DX:	Syncope, unspecified syncope type  Goal:	You underwent cardiac catheterization which was revealed no obstruction and patent stents. You also had an echocardiogram and carotid ultrasound which also were normal.  Assessment and plan of treatment:	-Please continue aspirin 81mg orally daily, Effient (prasugrel) 10mg orally daily, metoprolol succinate 25mg orally daily and your atorvastatin was increased to 80mg orally at bedtime  -Please follow up with Dr. Fernandez 1-2weeks